# Patient Record
Sex: FEMALE
[De-identification: names, ages, dates, MRNs, and addresses within clinical notes are randomized per-mention and may not be internally consistent; named-entity substitution may affect disease eponyms.]

---

## 2021-09-20 ENCOUNTER — APPOINTMENT (OUTPATIENT)
Dept: GASTROENTEROLOGY | Facility: CLINIC | Age: 61
End: 2021-09-20

## 2021-09-20 PROBLEM — Z00.00 ENCOUNTER FOR PREVENTIVE HEALTH EXAMINATION: Status: ACTIVE | Noted: 2021-09-20

## 2021-09-23 ENCOUNTER — EMERGENCY (EMERGENCY)
Facility: HOSPITAL | Age: 61
LOS: 1 days | Discharge: HOME | End: 2021-09-23
Attending: EMERGENCY MEDICINE
Payer: MEDICAID

## 2021-09-23 VITALS
HEART RATE: 126 BPM | OXYGEN SATURATION: 99 % | HEIGHT: 64 IN | WEIGHT: 179.9 LBS | TEMPERATURE: 100 F | DIASTOLIC BLOOD PRESSURE: 96 MMHG | RESPIRATION RATE: 22 BRPM | SYSTOLIC BLOOD PRESSURE: 148 MMHG

## 2021-09-23 VITALS
HEART RATE: 87 BPM | OXYGEN SATURATION: 100 % | SYSTOLIC BLOOD PRESSURE: 160 MMHG | TEMPERATURE: 99 F | DIASTOLIC BLOOD PRESSURE: 85 MMHG | RESPIRATION RATE: 18 BRPM

## 2021-09-23 LAB
ALBUMIN SERPL ELPH-MCNC: 4.4 G/DL — SIGNIFICANT CHANGE UP (ref 3.3–5)
ALP SERPL-CCNC: 99 U/L — SIGNIFICANT CHANGE UP (ref 40–120)
ALT FLD-CCNC: 30 U/L — SIGNIFICANT CHANGE UP (ref 10–45)
ANION GAP SERPL CALC-SCNC: 16 MMOL/L — SIGNIFICANT CHANGE UP (ref 5–17)
APPEARANCE UR: CLEAR — SIGNIFICANT CHANGE UP
AST SERPL-CCNC: 36 U/L — SIGNIFICANT CHANGE UP (ref 10–40)
BACTERIA # UR AUTO: ABNORMAL
BASE EXCESS BLDV CALC-SCNC: 1.9 MMOL/L — SIGNIFICANT CHANGE UP (ref -2–2)
BASOPHILS # BLD AUTO: 0.02 K/UL — SIGNIFICANT CHANGE UP (ref 0–0.2)
BASOPHILS NFR BLD AUTO: 0.2 % — SIGNIFICANT CHANGE UP (ref 0–2)
BILIRUB SERPL-MCNC: 0.5 MG/DL — SIGNIFICANT CHANGE UP (ref 0.2–1.2)
BILIRUB UR-MCNC: NEGATIVE — SIGNIFICANT CHANGE UP
BUN SERPL-MCNC: 16 MG/DL — SIGNIFICANT CHANGE UP (ref 7–23)
CA-I SERPL-SCNC: 1.29 MMOL/L — SIGNIFICANT CHANGE UP (ref 1.15–1.33)
CALCIUM SERPL-MCNC: 10.5 MG/DL — SIGNIFICANT CHANGE UP (ref 8.4–10.5)
CHLORIDE BLDV-SCNC: 103 MMOL/L — SIGNIFICANT CHANGE UP (ref 96–108)
CHLORIDE SERPL-SCNC: 101 MMOL/L — SIGNIFICANT CHANGE UP (ref 96–108)
CO2 BLDV-SCNC: 30 MMOL/L — HIGH (ref 22–26)
CO2 SERPL-SCNC: 21 MMOL/L — LOW (ref 22–31)
COLOR SPEC: SIGNIFICANT CHANGE UP
CREAT SERPL-MCNC: 0.77 MG/DL — SIGNIFICANT CHANGE UP (ref 0.5–1.3)
DIFF PNL FLD: NEGATIVE — SIGNIFICANT CHANGE UP
EOSINOPHIL # BLD AUTO: 0.22 K/UL — SIGNIFICANT CHANGE UP (ref 0–0.5)
EOSINOPHIL NFR BLD AUTO: 1.7 % — SIGNIFICANT CHANGE UP (ref 0–6)
EPI CELLS # UR: 1 /HPF — SIGNIFICANT CHANGE UP
GAS PNL BLDV: 136 MMOL/L — SIGNIFICANT CHANGE UP (ref 136–145)
GAS PNL BLDV: SIGNIFICANT CHANGE UP
GLUCOSE BLDV-MCNC: 88 MG/DL — SIGNIFICANT CHANGE UP (ref 70–99)
GLUCOSE SERPL-MCNC: 88 MG/DL — SIGNIFICANT CHANGE UP (ref 70–99)
GLUCOSE UR QL: NEGATIVE — SIGNIFICANT CHANGE UP
HCO3 BLDV-SCNC: 28 MMOL/L — SIGNIFICANT CHANGE UP (ref 22–29)
HCT VFR BLD CALC: 39.1 % — SIGNIFICANT CHANGE UP (ref 34.5–45)
HCT VFR BLDA CALC: 40 % — SIGNIFICANT CHANGE UP (ref 34.5–46.5)
HGB BLD CALC-MCNC: 13.3 G/DL — SIGNIFICANT CHANGE UP (ref 11.7–16.1)
HGB BLD-MCNC: 12.7 G/DL — SIGNIFICANT CHANGE UP (ref 11.5–15.5)
IMM GRANULOCYTES NFR BLD AUTO: 0.4 % — SIGNIFICANT CHANGE UP (ref 0–1.5)
KETONES UR-MCNC: NEGATIVE — SIGNIFICANT CHANGE UP
LACTATE BLDV-MCNC: 1.6 MMOL/L — SIGNIFICANT CHANGE UP (ref 0.7–2)
LEUKOCYTE ESTERASE UR-ACNC: NEGATIVE — SIGNIFICANT CHANGE UP
LIDOCAIN IGE QN: 30 U/L — SIGNIFICANT CHANGE UP (ref 7–60)
LYMPHOCYTES # BLD AUTO: 2.79 K/UL — SIGNIFICANT CHANGE UP (ref 1–3.3)
LYMPHOCYTES # BLD AUTO: 21.6 % — SIGNIFICANT CHANGE UP (ref 13–44)
MCHC RBC-ENTMCNC: 27.7 PG — SIGNIFICANT CHANGE UP (ref 27–34)
MCHC RBC-ENTMCNC: 32.5 GM/DL — SIGNIFICANT CHANGE UP (ref 32–36)
MCV RBC AUTO: 85.2 FL — SIGNIFICANT CHANGE UP (ref 80–100)
MONOCYTES # BLD AUTO: 0.74 K/UL — SIGNIFICANT CHANGE UP (ref 0–0.9)
MONOCYTES NFR BLD AUTO: 5.7 % — SIGNIFICANT CHANGE UP (ref 2–14)
NEUTROPHILS # BLD AUTO: 9.09 K/UL — HIGH (ref 1.8–7.4)
NEUTROPHILS NFR BLD AUTO: 70.4 % — SIGNIFICANT CHANGE UP (ref 43–77)
NITRITE UR-MCNC: POSITIVE
NRBC # BLD: 0 /100 WBCS — SIGNIFICANT CHANGE UP (ref 0–0)
PCO2 BLDV: 50 MMHG — HIGH (ref 39–42)
PH BLDV: 7.36 — SIGNIFICANT CHANGE UP (ref 7.32–7.43)
PH UR: 5.5 — SIGNIFICANT CHANGE UP (ref 5–8)
PLATELET # BLD AUTO: 233 K/UL — SIGNIFICANT CHANGE UP (ref 150–400)
PO2 BLDV: 41 MMHG — SIGNIFICANT CHANGE UP (ref 25–45)
POTASSIUM BLDV-SCNC: 3.8 MMOL/L — SIGNIFICANT CHANGE UP (ref 3.5–5.1)
POTASSIUM SERPL-MCNC: 4.2 MMOL/L — SIGNIFICANT CHANGE UP (ref 3.5–5.3)
POTASSIUM SERPL-SCNC: 4.2 MMOL/L — SIGNIFICANT CHANGE UP (ref 3.5–5.3)
PROT SERPL-MCNC: 8.2 G/DL — SIGNIFICANT CHANGE UP (ref 6–8.3)
PROT UR-MCNC: NEGATIVE — SIGNIFICANT CHANGE UP
RBC # BLD: 4.59 M/UL — SIGNIFICANT CHANGE UP (ref 3.8–5.2)
RBC # FLD: 12.2 % — SIGNIFICANT CHANGE UP (ref 10.3–14.5)
RBC CASTS # UR COMP ASSIST: 1 /HPF — SIGNIFICANT CHANGE UP (ref 0–4)
SAO2 % BLDV: 67.7 % — SIGNIFICANT CHANGE UP (ref 67–88)
SODIUM SERPL-SCNC: 138 MMOL/L — SIGNIFICANT CHANGE UP (ref 135–145)
SP GR SPEC: 1.02 — SIGNIFICANT CHANGE UP (ref 1.01–1.02)
UROBILINOGEN FLD QL: NEGATIVE — SIGNIFICANT CHANGE UP
WBC # BLD: 12.91 K/UL — HIGH (ref 3.8–10.5)
WBC # FLD AUTO: 12.91 K/UL — HIGH (ref 3.8–10.5)
WBC UR QL: 2 /HPF — SIGNIFICANT CHANGE UP (ref 0–5)

## 2021-09-23 PROCEDURE — 87186 SC STD MICRODIL/AGAR DIL: CPT

## 2021-09-23 PROCEDURE — 96375 TX/PRO/DX INJ NEW DRUG ADDON: CPT

## 2021-09-23 PROCEDURE — 83605 ASSAY OF LACTIC ACID: CPT

## 2021-09-23 PROCEDURE — 82330 ASSAY OF CALCIUM: CPT

## 2021-09-23 PROCEDURE — 93005 ELECTROCARDIOGRAM TRACING: CPT

## 2021-09-23 PROCEDURE — 93010 ELECTROCARDIOGRAM REPORT: CPT

## 2021-09-23 PROCEDURE — 85014 HEMATOCRIT: CPT

## 2021-09-23 PROCEDURE — 99284 EMERGENCY DEPT VISIT MOD MDM: CPT | Mod: 25

## 2021-09-23 PROCEDURE — 82947 ASSAY GLUCOSE BLOOD QUANT: CPT

## 2021-09-23 PROCEDURE — 82803 BLOOD GASES ANY COMBINATION: CPT

## 2021-09-23 PROCEDURE — 74177 CT ABD & PELVIS W/CONTRAST: CPT | Mod: 26,MA

## 2021-09-23 PROCEDURE — 83690 ASSAY OF LIPASE: CPT

## 2021-09-23 PROCEDURE — 74177 CT ABD & PELVIS W/CONTRAST: CPT | Mod: MA

## 2021-09-23 PROCEDURE — 87086 URINE CULTURE/COLONY COUNT: CPT

## 2021-09-23 PROCEDURE — 81001 URINALYSIS AUTO W/SCOPE: CPT

## 2021-09-23 PROCEDURE — 84132 ASSAY OF SERUM POTASSIUM: CPT

## 2021-09-23 PROCEDURE — 84295 ASSAY OF SERUM SODIUM: CPT

## 2021-09-23 PROCEDURE — 80053 COMPREHEN METABOLIC PANEL: CPT

## 2021-09-23 PROCEDURE — 85025 COMPLETE CBC W/AUTO DIFF WBC: CPT

## 2021-09-23 PROCEDURE — 99285 EMERGENCY DEPT VISIT HI MDM: CPT

## 2021-09-23 PROCEDURE — 96374 THER/PROPH/DIAG INJ IV PUSH: CPT | Mod: XU

## 2021-09-23 PROCEDURE — 82435 ASSAY OF BLOOD CHLORIDE: CPT

## 2021-09-23 PROCEDURE — 85018 HEMOGLOBIN: CPT

## 2021-09-23 RX ORDER — CEPHALEXIN 500 MG
1 CAPSULE ORAL
Qty: 40 | Refills: 0
Start: 2021-09-23 | End: 2021-10-02

## 2021-09-23 RX ORDER — ONDANSETRON 8 MG/1
4 TABLET, FILM COATED ORAL ONCE
Refills: 0 | Status: COMPLETED | OUTPATIENT
Start: 2021-09-23 | End: 2021-09-23

## 2021-09-23 RX ORDER — SODIUM CHLORIDE 9 MG/ML
1000 INJECTION INTRAMUSCULAR; INTRAVENOUS; SUBCUTANEOUS ONCE
Refills: 0 | Status: COMPLETED | OUTPATIENT
Start: 2021-09-23 | End: 2021-09-23

## 2021-09-23 RX ORDER — ACETAMINOPHEN 500 MG
975 TABLET ORAL ONCE
Refills: 0 | Status: COMPLETED | OUTPATIENT
Start: 2021-09-23 | End: 2021-09-23

## 2021-09-23 RX ORDER — CEFTRIAXONE 500 MG/1
1000 INJECTION, POWDER, FOR SOLUTION INTRAMUSCULAR; INTRAVENOUS ONCE
Refills: 0 | Status: COMPLETED | OUTPATIENT
Start: 2021-09-23 | End: 2021-09-23

## 2021-09-23 RX ADMIN — SODIUM CHLORIDE 1000 MILLILITER(S): 9 INJECTION INTRAMUSCULAR; INTRAVENOUS; SUBCUTANEOUS at 17:03

## 2021-09-23 RX ADMIN — CEFTRIAXONE 100 MILLIGRAM(S): 500 INJECTION, POWDER, FOR SOLUTION INTRAMUSCULAR; INTRAVENOUS at 19:00

## 2021-09-23 RX ADMIN — Medication 975 MILLIGRAM(S): at 17:03

## 2021-09-23 RX ADMIN — ONDANSETRON 4 MILLIGRAM(S): 8 TABLET, FILM COATED ORAL at 17:03

## 2021-09-23 NOTE — ED PROVIDER NOTE - NSFOLLOWUPINSTRUCTIONS_ED_ALL_ED_FT
1) Follow up with your PCP and surgeon within 1 week of being seen in the Emergency Department   2) Return to the ED immediately for new or worsening symptoms severe pain, fever, unable to eat/drink, not improving   3) Please continue to take any home medications as prescribed  4) Your test results from your ED visit were discussed with you prior to discharge  5) You were provided with a copy of your test results  6) Please take all antibiotics as directed. Please follow all pharmacy packaging instructions and warnings.

## 2021-09-23 NOTE — ED PROVIDER NOTE - PATIENT PORTAL LINK FT
You can access the FollowMyHealth Patient Portal offered by [facility] by registering at the following website: [email or web address]. By joining [employer]’s FollowMyHealth portal, you will also be able to view your health information using other applications (apps) compatible with our system.

## 2021-09-23 NOTE — ED POST DISCHARGE NOTE - ADDITIONAL DOCUMENTATION
9/23/21: Received form for rep (Patient Preferred Pharmacy Information Sheet) and was told that patient requested pharmacy change and for antibiotics to be sent there. I changed pharmacy and sent new rx for antibiotics. I called patient's # and left voicemail to confirm, CDU # left for call back. -Debbie Sharma PA-C

## 2021-09-23 NOTE — ED ADULT NURSE NOTE - OBJECTIVE STATEMENT
62 yo F a&ox4 with pmhx of HTN. Hypothyroidism, known periumbilical hernia with repair x2 last in 2019 presents with periumbilical pain that initially began 2 years ago but has worsened over the past two days, also reports no BM in 3 days. Pt denies radiation of pain, V/D, fevers/chills, cp, back pain. Reports passing gas regularly. abd soft, tender in midabdomen. NSR on CCM, Lungs CTA. afebrile on arrival VSS. Pt well appear in stretcher.

## 2021-09-23 NOTE — ED PROVIDER NOTE - PHYSICAL EXAMINATION
General: well appearing, no acute distress, AOx3  Skin: no rash, no pallor  Head: normocephalic, atraumatic  Eyes: clear conjunctiva, EOMI  ENMT: airway patent, no nasal discharge  Cardiovascular: normal rate, normal rhythm, S1/S2  Pulmonary: clear to auscultation bilaterally, no rales, rhonchi, or wheeze  Abdomen: soft, tender at mid abdomen, no guarding  Musculoskeletal: moving extremities well, no deformity  Psych: normal mood, normal affect

## 2021-09-23 NOTE — ED PROVIDER NOTE - OBJECTIVE STATEMENT
61 year old female with a pmhx of hypothyroid, HTN, presents to ED for evaluation of abdominal pain that has been intermittent x2 years. Pt reports coming in today due to worsening pain. Notes nausea without vomiting. [place] the [place] 1 [place] ago, saw a PCP for this and was advised to f/u with GI and a surgeon, which she has not done yet. Has not taken anything for pain. Patient denies any fever, chills, cp, sob, cough, diarrhea, dysuria. Hx of hernia repair in abdomen, pt unsure where.

## 2021-09-23 NOTE — ED PROVIDER NOTE - ATTENDING CONTRIBUTION TO CARE
Attending MD Luna: I personally have seen and examined this patient.  Resident note reviewed and agree on plan of care and except where noted.  See below for details.     seen in [place], [place]  [id number]    61F with PMH/PSH including hypothyroidism and HTN, s/p hernia repair presents to the ED with abdominal pain.  Reports that she has been having abdominal pain for two years but reports recent worsening.  Reports a month ago saw PMD Dr. JESSIE Knight and was referred to GI and surgery but has not had those appointment and has not had imaging.  Reports over the last week has noted a marked increase in pain.  Reports has not had a BM in three days.  Reports nausea, denies vomiting.  Denies bloody or black stools.  Denies dysuria, hematuria, change in urinary habits.  Denies chest pain or shortness of breath but reports when she bends forward to pick something up feels things "coming up" into her chest and feels it is difficult to catch her breath.  Denies fevers, chills.  A ten (10) point review of systems was negative other than as stated in the HPI or elsewhere in the chart.    Exam:   General: NAD  HENT: head NCAT, airway patent  Eyes: PERRL  Lungs: lungs CTAB with good inspiratory effort, no wheezing, no rhonchi, no rales  Cardiac: +S1S2, +tachycardic, no m/r/g  GI: abdomen soft with +BS, +tenderness to mid abdomen, no rebound, no guarding, exam limited secondary to body habitus  : no CVAT  MSK: FROM at neck, no tenderness to midline palpation, no stepoffs along length of spine, no calf tenderness, swelling, erythema or warmth  Neuro: moving all extremities spontaneously, sensory grossly intact, no gross neuro deficits  Psych: normal mood and affect    A/P: 61F with abdominal pain, acute on chronic, DDx includes SBO, incarcerated hernia, hiatal hernia, will obtain labs, CTAP, will obtain UA, give pain medication, give IVFs, keep npo, reassess

## 2021-09-23 NOTE — ED PROVIDER NOTE - NS ED ROS FT
General: no fever, no chills  Eyes: no vision changes, no eye pain  Cardiovascular: no chest pain, no edema  Respiratory: no cough, no shortness of breath  Gastrointestinal: +abdominal pain, +nausea, no vomiting, no diarrhea  Genitourinary: no dysuria, no hematuria  Musculoskeletal: no muscle pain, no joint pain  Skin: no rash, no lesions  Neuro: no numbness, no tingling  Psych: no depression, no anxiety

## 2021-09-23 NOTE — ED PROVIDER NOTE - PROGRESS NOTE DETAILS
ED Sign Out, eval for abd pain, pending CT and close reassessments for tx / dispo decision -- Truman Quan MD Vladimir Duran, PGY-2- Patient reevaluated, CT without emergent findings. Discussed results with patient and son-in-law. Aware of incidental adrenal nodule. Has surgery and GI f/u. Also advised PCP followed up. Discussed results of work up with patient. Patient agrees with plan to discharge home. All questions answered regarding plan. Strict return precautions given.

## 2021-09-23 NOTE — ED PROVIDER NOTE - CLINICAL SUMMARY MEDICAL DECISION MAKING FREE TEXT BOX
Vladimir Duran, PGY-2- 61 year old female with acute on chronic abd pain, and nausea. Pt with tachycardia on arrival. Rectal temp afebrile. Plan to obtain cbc, cmp, lipase, vbg with lactate, ua, ucx, reeval. Will treat nausea and pain, give IVF.

## 2021-10-20 ENCOUNTER — APPOINTMENT (OUTPATIENT)
Dept: SURGERY | Facility: CLINIC | Age: 61
End: 2021-10-20

## 2021-10-26 PROBLEM — Z00.00 ENCOUNTER FOR PREVENTIVE HEALTH EXAMINATION: Noted: 2021-10-26

## 2021-11-10 ENCOUNTER — APPOINTMENT (OUTPATIENT)
Dept: SURGERY | Facility: CLINIC | Age: 61
End: 2021-11-10
Payer: MEDICAID

## 2021-11-10 VITALS
WEIGHT: 217.59 LBS | DIASTOLIC BLOOD PRESSURE: 80 MMHG | TEMPERATURE: 97.7 F | HEART RATE: 91 BPM | SYSTOLIC BLOOD PRESSURE: 120 MMHG | HEIGHT: 64 IN | BODY MASS INDEX: 37.15 KG/M2 | OXYGEN SATURATION: 98 %

## 2021-11-10 DIAGNOSIS — I10 ESSENTIAL (PRIMARY) HYPERTENSION: ICD-10-CM

## 2021-11-10 DIAGNOSIS — E55.9 VITAMIN D DEFICIENCY, UNSPECIFIED: ICD-10-CM

## 2021-11-10 DIAGNOSIS — M47.9 SPONDYLOSIS, UNSPECIFIED: ICD-10-CM

## 2021-11-10 DIAGNOSIS — E07.9 DISORDER OF THYROID, UNSPECIFIED: ICD-10-CM

## 2021-11-10 DIAGNOSIS — R73.03 PREDIABETES.: ICD-10-CM

## 2021-11-10 PROCEDURE — 99205 OFFICE O/P NEW HI 60 MIN: CPT

## 2021-11-10 RX ORDER — LOSARTAN POTASSIUM 50 MG/1
50 TABLET, FILM COATED ORAL
Refills: 0 | Status: ACTIVE | COMMUNITY

## 2021-11-10 RX ORDER — LEVOTHYROXINE SODIUM 0.05 MG/1
50 TABLET ORAL
Refills: 0 | Status: ACTIVE | COMMUNITY

## 2021-11-10 RX ORDER — DOCUSATE CALCIUM 240 MG
CAPSULE ORAL
Refills: 0 | Status: ACTIVE | COMMUNITY

## 2021-11-15 ENCOUNTER — APPOINTMENT (OUTPATIENT)
Dept: SURGERY | Facility: CLINIC | Age: 61
End: 2021-11-15
Payer: MEDICAID

## 2021-11-15 VITALS
DIASTOLIC BLOOD PRESSURE: 91 MMHG | BODY MASS INDEX: 36.25 KG/M2 | WEIGHT: 197 LBS | SYSTOLIC BLOOD PRESSURE: 145 MMHG | HEART RATE: 93 BPM | HEIGHT: 62 IN

## 2021-11-15 DIAGNOSIS — R10.9 UNSPECIFIED ABDOMINAL PAIN: ICD-10-CM

## 2021-11-15 DIAGNOSIS — M62.08 SEPARATION OF MUSCLE (NONTRAUMATIC), OTHER SITE: ICD-10-CM

## 2021-11-15 DIAGNOSIS — Z87.19 OTHER SPECIFIED POSTPROCEDURAL STATES: ICD-10-CM

## 2021-11-15 DIAGNOSIS — Z87.19 PERSONAL HISTORY OF OTHER DISEASES OF THE DIGESTIVE SYSTEM: ICD-10-CM

## 2021-11-15 DIAGNOSIS — E27.8 OTHER SPECIFIED DISORDERS OF ADRENAL GLAND: ICD-10-CM

## 2021-11-15 DIAGNOSIS — K59.09 OTHER CONSTIPATION: ICD-10-CM

## 2021-11-15 DIAGNOSIS — Z98.890 OTHER SPECIFIED POSTPROCEDURAL STATES: ICD-10-CM

## 2021-11-15 PROCEDURE — 99204 OFFICE O/P NEW MOD 45 MIN: CPT

## 2021-11-15 NOTE — PHYSICAL EXAM
[Respiratory Effort] : normal respiratory effort [Normal Rate and Rhythm] : normal rate and rhythm [No HSM] : no hepatosplenomegaly [Tender] : was nontender [Enlarged] : not enlarged [No Rash or Lesion] : No rash or lesion [Alert] : alert [Oriented to Person] : oriented to person [Oriented to Place] : oriented to place [Oriented to Time] : oriented to time [Anxious] : anxious [de-identified] : Overweight/ obese, but appears well and in no acute distress. [de-identified] : Normocephalic and atraumatic.  [de-identified] : Supple with full range of motion.  [FreeTextEntry1] : No cervical, supraclavicular, axillary or inguinal adenopathy.  [de-identified] : Deferred.  [de-identified] : Full, obese, but soft, non tense.\par Generous but well defined and delineated diastasis recti.\par Well healed incisions from prior open and laparoscopic hernia repairs.\par Some SQ scarring/ foreign body reaction around umbilicus.\par However, there is only the general laxity consistent with the diastasis- no focal fascia defects or SQ masses to suggest recurrent periumbilical herniation.\par Bilateral groins intact throughout inguinal, femoral or more lateral Spigelian spaces. [de-identified] : Normal external genitalia. [de-identified] : Deferred.  [de-identified] : Grossly symmetric and within normal limits without motor or sensory deficits.  [de-identified] : though appropriately so...

## 2021-11-15 NOTE — REVIEW OF SYSTEMS
[Feeling Poorly] : not feeling poorly [Feeling Tired] : feeling tired [As Noted in HPI] : as noted in HPI [Anxiety] : anxiety [Depression] : no depression [Negative] : Heme/Lymph [de-identified] : regarding this issue and visit...

## 2021-11-15 NOTE — DATA REVIEWED
[FreeTextEntry1] : EXAM:  CT ABDOMEN AND PELVIS IC                      \par PROCEDURE DATE:  09/23/2021  \par INTERPRETATION:  CLINICAL INFORMATION: Worsening mid abdominal pain. Patient has had a hernia repair.\par \par COMPARISON: None.\par \par CONTRAST/COMPLICATIONS:\par IV Contrast: 90 cc Omnipaque 350 was administered; 10 cc were discarded.\par Oral Contrast: None.\par Complications: None.\par \par PROCEDURE:\par CT of the Abdomen and Pelvis was performed.\par Sagittal and coronal reformats were performed.\par \par FINDINGS:\par LOWER CHEST: Unremarkable.\par LIVER: Mild hepatic steatosis. The liver is normal in size without focal lesions.\par BILE DUCTS: Normal caliber.\par GALLBLADDER: Within normal limits.\par SPLEEN: Within normal limits.\par PANCREAS: Within normal limits.\par ADRENALS: There is 1.8 cm indeterminate right adrenal nodule. The left adrenal gland is unremarkable.\par KIDNEYS/URETERS: Within normal limits.\par BLADDER: Within normal limits.\par REPRODUCTIVE ORGANS: Uterus within normal limits.\par BOWEL: No bowel obstruction. Appendix within normal limits\par PERITONEUM: No ascites.\par VESSELS: Within normal limits.\par RETROPERITONEUM/LYMPH NODES: No lymphadenopathy.\par ABDOMINAL WALL: Status post periumbilical hernia repair with mesh placement.. There is diastases of the anterior abdominal wall musculature without focal hernia.\par BONES: Degenerative spinal disease.\par \par IMPRESSION:\par No acute pathology in the abdomen or pelvis\par Indeterminant right adrenal nodule, if clinically indicated further characterization may be performed with abdominal MRI.\par Mild hepatic steatosis.\par Diastases of the anterior abdominal musculature without associated focal herniation.\par \par --- End of Report ---\par \par SHAHRIAR KRISHNAMURTHY MD; Resident Radiology\par This document has been electronically signed.\par LIVIER CHO MD; Attending Radiologist\par This document has been electronically signed. Sep 23 2021  5:55PM\par

## 2021-11-15 NOTE — PHYSICAL EXAM
[Normal Breath Sounds] : Normal breath sounds [Normal Heart Sounds] : normal heart sounds [No Rash or Lesion] : No rash or lesion [Alert] : alert [Oriented to Person] : oriented to person [Oriented to Place] : oriented to place [Oriented to Time] : oriented to time [Calm] : calm [de-identified] : NAD [de-identified] : EOM intact [de-identified] : obscured by head covering [de-identified] : Abdomen - There is a small periumbilical scar.  The abdomen is soft, non-distended, and has mild periumbilical tenderness.  There is mild right CVA tenderness. [de-identified] : FISHER x 3

## 2021-11-15 NOTE — PHYSICAL EXAM
[Normal Breath Sounds] : Normal breath sounds [Normal Heart Sounds] : normal heart sounds [No Rash or Lesion] : No rash or lesion [Alert] : alert [Oriented to Person] : oriented to person [Oriented to Place] : oriented to place [Oriented to Time] : oriented to time [Calm] : calm [de-identified] : NAD [de-identified] : EOM intact [de-identified] : obscured by head covering [de-identified] : Abdomen - There is a small periumbilical scar.  The abdomen is soft, non-distended, and has mild periumbilical tenderness.  There is mild right CVA tenderness. [de-identified] : FISHER x 3

## 2021-11-15 NOTE — REASON FOR VISIT
[Source: ______] : History obtained from [unfilled] [Consultation] : a consultation visit [Other: _____] : [unfilled] [Source: ________] : History obtained from [unfilled]

## 2021-11-15 NOTE — PLAN
[FreeTextEntry1] : Non specific, poorly localized flank and abdominal pain.  Patient reports prior negative colonoscopy.\par Preliminary information and recommendations provided for constipation.\par However, she does already have a GI involved for longer term care of constipation +/- pain.\par Exam today negative for recurrent hernia.\par CT scan report and images personally reviewed- also negative for recurrent hernia.\par As such, reassurance given and explanation/ education provided regarding diastasis.\par Will refer to Endocrine surgeon regarding further evaluation +/- intervention as appropriate for indeterminate right adrenal nodule.\par Ms. Godoy and her sister are pleased.  They leave in good spirits and are able to verbalize the issue and recommendations as outlined above.  I remain available PRN.\par Please note that more than 50% of face to face time was spent in counseling and coordination of care.

## 2021-11-15 NOTE — REVIEW OF SYSTEMS
[Abdominal Pain] : abdominal pain [As Noted in HPI] : as noted in HPI [Negative] : Heme/Lymph [FreeTextEntry9] : chronic right flank/lower back pain

## 2021-11-15 NOTE — HISTORY OF PRESENT ILLNESS
[de-identified] : Very pleasant though admittedly anxious patient who presents with complaint of 1+ year of right flank and periumbilical discomfort.\par Her sister is present for the visit today and serves at  as is their preference.\par Ms. Godoy reports acute on chronic constipation, but is free of any upper GI complaints.\par She has no reported focal visible/ palpable changes of the abdominal wall, but is concerned that "something is wrong" following her prior abdominal wall hernia repairs.\par A CT scan of the abdomen and pelvis has already been done prior to today's visit...

## 2021-11-16 ENCOUNTER — NON-APPOINTMENT (OUTPATIENT)
Age: 61
End: 2021-11-16

## 2021-11-16 LAB
ACTH SER-ACNC: 19 PG/ML
ALDOSTERONE SERUM: 11.8 NG/DL

## 2021-11-22 ENCOUNTER — NON-APPOINTMENT (OUTPATIENT)
Age: 61
End: 2021-11-22

## 2021-11-22 LAB
METANEPHRINE, PL: 41 PG/ML
NORMETANEPHRINE, PL: 94.5 PG/ML

## 2021-11-24 ENCOUNTER — NON-APPOINTMENT (OUTPATIENT)
Age: 61
End: 2021-11-24

## 2021-11-24 LAB
CORTIS 24H UR-MCNC: 24 H
CORTIS 24H UR-MRATE: 10 MCG/24 H
DOPAMINE UR-MCNC: 108 UG/L
DOPAMINE, UR, 24HR: 197 UG/24 HR
EPINEPH UR-MCNC: 3 UG/L
EPINEPHRINE, U, 24HR: 5 UG/24 HR
NOREPINEPH UR-MCNC: 38 UG/L
NOREPINEPHRINE,U,24H: 69 UG/24 HR
SPECIMEN VOL 24H UR: 1825 ML

## 2021-12-01 ENCOUNTER — NON-APPOINTMENT (OUTPATIENT)
Age: 61
End: 2021-12-01

## 2021-12-01 LAB — RENIN ACTIVITY, PLASMA: 1.16 NG/ML/HR

## 2021-12-04 ENCOUNTER — OUTPATIENT (OUTPATIENT)
Dept: OUTPATIENT SERVICES | Facility: HOSPITAL | Age: 61
LOS: 1 days | End: 2021-12-04
Payer: MEDICAID

## 2021-12-04 ENCOUNTER — APPOINTMENT (OUTPATIENT)
Dept: MRI IMAGING | Facility: HOSPITAL | Age: 61
End: 2021-12-04
Payer: MEDICAID

## 2021-12-04 DIAGNOSIS — E27.8 OTHER SPECIFIED DISORDERS OF ADRENAL GLAND: ICD-10-CM

## 2021-12-04 PROCEDURE — A9579: CPT

## 2021-12-04 PROCEDURE — 74183 MRI ABD W/O CNTR FLWD CNTR: CPT | Mod: 26

## 2021-12-04 PROCEDURE — 74183 MRI ABD W/O CNTR FLWD CNTR: CPT

## 2021-12-09 ENCOUNTER — APPOINTMENT (OUTPATIENT)
Dept: SURGERY | Facility: CLINIC | Age: 61
End: 2021-12-09

## 2021-12-15 ENCOUNTER — APPOINTMENT (OUTPATIENT)
Dept: ENDOCRINOLOGY | Facility: CLINIC | Age: 61
End: 2021-12-15

## 2022-03-03 ENCOUNTER — APPOINTMENT (OUTPATIENT)
Dept: GASTROENTEROLOGY | Facility: CLINIC | Age: 62
End: 2022-03-03